# Patient Record
Sex: FEMALE | Race: BLACK OR AFRICAN AMERICAN | Employment: FULL TIME | URBAN - METROPOLITAN AREA
[De-identification: names, ages, dates, MRNs, and addresses within clinical notes are randomized per-mention and may not be internally consistent; named-entity substitution may affect disease eponyms.]

---

## 2019-05-25 ENCOUNTER — HOSPITAL ENCOUNTER (EMERGENCY)
Age: 49
Discharge: HOME | End: 2019-05-25
Attending: EMERGENCY MEDICINE
Payer: MEDICAID

## 2019-05-25 ENCOUNTER — APPOINTMENT (OUTPATIENT)
Dept: GENERAL RADIOLOGY | Age: 49
End: 2019-05-25
Attending: EMERGENCY MEDICINE
Payer: MEDICAID

## 2019-05-25 VITALS
HEART RATE: 65 BPM | TEMPERATURE: 98 F | BODY MASS INDEX: 33.13 KG/M2 | RESPIRATION RATE: 16 BRPM | DIASTOLIC BLOOD PRESSURE: 78 MMHG | OXYGEN SATURATION: 99 % | SYSTOLIC BLOOD PRESSURE: 143 MMHG | WEIGHT: 180 LBS | HEIGHT: 62 IN

## 2019-05-25 DIAGNOSIS — R07.9 CHEST PAIN, UNSPECIFIED TYPE: Primary | ICD-10-CM

## 2019-05-25 LAB
ALBUMIN SERPL-MCNC: 3.4 G/DL (ref 3.4–5)
ALBUMIN/GLOB SERPL: 0.9 {RATIO} (ref 0.8–1.7)
ALP SERPL-CCNC: 68 U/L (ref 45–117)
ALT SERPL-CCNC: 20 U/L (ref 13–56)
ANION GAP SERPL CALC-SCNC: 7 MMOL/L (ref 3–18)
AST SERPL-CCNC: 12 U/L (ref 15–37)
BASOPHILS # BLD: 0.1 K/UL (ref 0–0.1)
BASOPHILS NFR BLD: 1 % (ref 0–2)
BILIRUB SERPL-MCNC: 0.3 MG/DL (ref 0.2–1)
BNP SERPL-MCNC: 352 PG/ML (ref 0–450)
BUN SERPL-MCNC: 17 MG/DL (ref 7–18)
BUN/CREAT SERPL: 25 (ref 12–20)
CALCIUM SERPL-MCNC: 8.7 MG/DL (ref 8.5–10.1)
CHLORIDE SERPL-SCNC: 108 MMOL/L (ref 100–108)
CK MB CFR SERPL CALC: NORMAL % (ref 0–4)
CK MB SERPL-MCNC: <1 NG/ML (ref 5–25)
CK SERPL-CCNC: 54 U/L (ref 26–192)
CO2 SERPL-SCNC: 25 MMOL/L (ref 21–32)
CREAT SERPL-MCNC: 0.69 MG/DL (ref 0.6–1.3)
DIFFERENTIAL METHOD BLD: ABNORMAL
EOSINOPHIL # BLD: 0.2 K/UL (ref 0–0.4)
EOSINOPHIL NFR BLD: 2 % (ref 0–5)
ERYTHROCYTE [DISTWIDTH] IN BLOOD BY AUTOMATED COUNT: 12.6 % (ref 11.6–14.5)
GLOBULIN SER CALC-MCNC: 3.6 G/DL (ref 2–4)
GLUCOSE SERPL-MCNC: 105 MG/DL (ref 74–99)
HCT VFR BLD AUTO: 40.1 % (ref 35–45)
HGB BLD-MCNC: 13.8 G/DL (ref 12–16)
LYMPHOCYTES # BLD: 4 K/UL (ref 0.9–3.6)
LYMPHOCYTES NFR BLD: 47 % (ref 21–52)
MCH RBC QN AUTO: 30.7 PG (ref 24–34)
MCHC RBC AUTO-ENTMCNC: 34.4 G/DL (ref 31–37)
MCV RBC AUTO: 89.3 FL (ref 74–97)
MONOCYTES # BLD: 0.5 K/UL (ref 0.05–1.2)
MONOCYTES NFR BLD: 6 % (ref 3–10)
NEUTS SEG # BLD: 3.6 K/UL (ref 1.8–8)
NEUTS SEG NFR BLD: 44 % (ref 40–73)
PLATELET # BLD AUTO: 298 K/UL (ref 135–420)
PMV BLD AUTO: 10.2 FL (ref 9.2–11.8)
POTASSIUM SERPL-SCNC: 3.7 MMOL/L (ref 3.5–5.5)
PROT SERPL-MCNC: 7 G/DL (ref 6.4–8.2)
RBC # BLD AUTO: 4.49 M/UL (ref 4.2–5.3)
SODIUM SERPL-SCNC: 140 MMOL/L (ref 136–145)
TROPONIN I SERPL-MCNC: <0.02 NG/ML (ref 0–0.04)
WBC # BLD AUTO: 8.3 K/UL (ref 4.6–13.2)

## 2019-05-25 PROCEDURE — 93005 ELECTROCARDIOGRAM TRACING: CPT

## 2019-05-25 PROCEDURE — 85025 COMPLETE CBC W/AUTO DIFF WBC: CPT

## 2019-05-25 PROCEDURE — 80053 COMPREHEN METABOLIC PANEL: CPT

## 2019-05-25 PROCEDURE — 99283 EMERGENCY DEPT VISIT LOW MDM: CPT

## 2019-05-25 PROCEDURE — 82550 ASSAY OF CK (CPK): CPT

## 2019-05-25 PROCEDURE — 83880 ASSAY OF NATRIURETIC PEPTIDE: CPT

## 2019-05-25 PROCEDURE — 71045 X-RAY EXAM CHEST 1 VIEW: CPT

## 2019-05-25 RX ORDER — SIMVASTATIN 10 MG/1
10 TABLET, FILM COATED ORAL
COMMUNITY

## 2019-05-25 RX ORDER — NAPROXEN 500 MG/1
500 TABLET ORAL 2 TIMES DAILY WITH MEALS
Qty: 20 TAB | Refills: 0 | Status: SHIPPED | OUTPATIENT
Start: 2019-05-25 | End: 2019-09-10

## 2019-05-25 NOTE — ED PROVIDER NOTES
EMERGENCY DEPARTMENT HISTORY AND PHYSICAL EXAM    11:08 AM      Date: 5/25/2019  Patient Name: Sabra Da Silva    History of Presenting Illness     Chief Complaint   Patient presents with    Chest Pain         History Provided By: Patient    Additional History (Context): Sabra Da Silva is a 51 y.o. female with hypertension who presents with chest pain for 2 days. Patient states the pain is substernal, 4 out of 10, nonradiating, nothing makes better or worse. Patient has had some mild nausea. However denies any vomiting or diarrhea. She denies any cough or shortness of breath. Patient denies smoking alcohol or recreational drug use. PCP: None    Current Outpatient Medications   Medication Sig Dispense Refill    simvastatin (ZOCOR) 10 mg tablet Take 10 mg by mouth nightly.  naproxen (NAPROSYN) 500 mg tablet Take 1 Tab by mouth two (2) times daily (with meals). 20 Tab 0       Past History     Past Medical History:  Past Medical History:   Diagnosis Date    Heart failure ([address]     Hypertension        Past Surgical History:  Past Surgical History:   Procedure Laterality Date    CARDIAC SURG PROCEDURE UNLIST         Family History:  History reviewed. No pertinent family history. Social History:  Social History     Tobacco Use    Smoking status: Never Smoker    Smokeless tobacco: Never Used   Substance Use Topics    Alcohol use: Not on file    Drug use: Not on file       Allergies: Allergies   Allergen Reactions    Penicillins Rash    Sulfa (Sulfonamide Antibiotics) Rash         Review of Systems       Review of Systems   Constitutional: Negative. Negative for chills, diaphoresis and fever. HENT: Negative. Negative for congestion, rhinorrhea and sore throat. Eyes: Negative. Negative for pain, discharge and redness. Respiratory: Negative. Negative for cough, chest tightness, shortness of breath and wheezing. Cardiovascular: Negative. Negative for chest pain.    Gastrointestinal: Negative. Negative for abdominal pain, constipation, diarrhea, nausea and vomiting. Genitourinary: Negative. Negative for dysuria, flank pain, frequency, hematuria and urgency. Musculoskeletal: Negative. Negative for back pain and neck pain. Skin: Negative. Negative for rash. Neurological: Negative. Negative for syncope, weakness, numbness and headaches. Psychiatric/Behavioral: Negative. All other systems reviewed and are negative. Physical Exam     Visit Vitals  /78 (BP Patient Position: At rest)   Pulse 65   Temp 98 °F (36.7 °C)   Resp 16   Ht 5' 2\" (1.575 m)   Wt 81.6 kg (180 lb)   SpO2 99%   BMI 32.92 kg/m²         Physical Exam   Constitutional: She appears well-developed and well-nourished. Non-toxic appearance. She does not have a sickly appearance. She does not appear ill. No distress. HENT:   Head: Normocephalic and atraumatic. Mouth/Throat: Oropharynx is clear and moist. No oropharyngeal exudate. Eyes: Pupils are equal, round, and reactive to light. Conjunctivae and EOM are normal. No scleral icterus. Neck: Normal range of motion. Neck supple. No hepatojugular reflux and no JVD present. No tracheal deviation present. No thyromegaly present. Cardiovascular: Normal rate, regular rhythm, S1 normal, S2 normal, normal heart sounds, intact distal pulses and normal pulses. Exam reveals no gallop, no S3 and no S4. No murmur heard. Pulses:       Radial pulses are 2+ on the right side, and 2+ on the left side. Dorsalis pedis pulses are 2+ on the right side, and 2+ on the left side. Pulmonary/Chest: Effort normal and breath sounds normal. No respiratory distress. She has no decreased breath sounds. She has no wheezes. She has no rhonchi. She has no rales. Abdominal: Soft. Normal appearance and bowel sounds are normal. She exhibits no distension and no mass. There is no hepatosplenomegaly. There is no tenderness.  There is no rigidity, no rebound, no guarding, no CVA tenderness, no tenderness at McBurney's point and negative Castaneda's sign. Musculoskeletal: Normal range of motion. Strength 5 out of 5 throughout. Lymphadenopathy:        Head (right side): No submental, no submandibular, no preauricular and no occipital adenopathy present. Head (left side): No submental, no submandibular, no preauricular and no occipital adenopathy present. She has no cervical adenopathy. Right: No supraclavicular adenopathy present. Left: No supraclavicular adenopathy present. Neurological: She is alert. She has normal strength and normal reflexes. She is not disoriented. No cranial nerve deficit or sensory deficit. Coordination and gait normal. GCS eye subscore is 4. GCS verbal subscore is 5. GCS motor subscore is 6. Grossly intact. Skin: Skin is warm, dry and intact. No rash noted. She is not diaphoretic. Psychiatric: She has a normal mood and affect. Her speech is normal and behavior is normal. Judgment and thought content normal. Cognition and memory are normal.   Nursing note and vitals reviewed.         Diagnostic Study Results     Labs -  Recent Results (from the past 12 hour(s))   EKG, 12 LEAD, INITIAL    Collection Time: 05/25/19 10:02 AM   Result Value Ref Range    Ventricular Rate 63 BPM    Atrial Rate 63 BPM    P-R Interval 202 ms    QRS Duration 80 ms    Q-T Interval 424 ms    QTC Calculation (Bezet) 433 ms    Calculated P Axis 58 degrees    Calculated R Axis -9 degrees    Calculated T Axis 48 degrees    Diagnosis       Poor data quality, interpretation may be adversely affected  Normal sinus rhythm  Possible Left atrial enlargement  Anteroseptal infarct , age undetermined  Abnormal ECG  No previous ECGs available     CBC WITH AUTOMATED DIFF    Collection Time: 05/25/19 10:15 AM   Result Value Ref Range    WBC 8.3 4.6 - 13.2 K/uL    RBC 4.49 4.20 - 5.30 M/uL    HGB 13.8 12.0 - 16.0 g/dL    HCT 40.1 35.0 - 45.0 %    MCV 89.3 74.0 - 97.0 FL MCH 30.7 24.0 - 34.0 PG    MCHC 34.4 31.0 - 37.0 g/dL    RDW 12.6 11.6 - 14.5 %    PLATELET 829 142 - 999 K/uL    MPV 10.2 9.2 - 11.8 FL    NEUTROPHILS 44 40 - 73 %    LYMPHOCYTES 47 21 - 52 %    MONOCYTES 6 3 - 10 %    EOSINOPHILS 2 0 - 5 %    BASOPHILS 1 0 - 2 %    ABS. NEUTROPHILS 3.6 1.8 - 8.0 K/UL    ABS. LYMPHOCYTES 4.0 (H) 0.9 - 3.6 K/UL    ABS. MONOCYTES 0.5 0.05 - 1.2 K/UL    ABS. EOSINOPHILS 0.2 0.0 - 0.4 K/UL    ABS. BASOPHILS 0.1 0.0 - 0.1 K/UL    DF AUTOMATED     METABOLIC PANEL, COMPREHENSIVE    Collection Time: 05/25/19 10:15 AM   Result Value Ref Range    Sodium 140 136 - 145 mmol/L    Potassium 3.7 3.5 - 5.5 mmol/L    Chloride 108 100 - 108 mmol/L    CO2 25 21 - 32 mmol/L    Anion gap 7 3.0 - 18 mmol/L    Glucose 105 (H) 74 - 99 mg/dL    BUN 17 7.0 - 18 MG/DL    Creatinine 0.69 0.6 - 1.3 MG/DL    BUN/Creatinine ratio 25 (H) 12 - 20      GFR est AA >60 >60 ml/min/1.73m2    GFR est non-AA >60 >60 ml/min/1.73m2    Calcium 8.7 8.5 - 10.1 MG/DL    Bilirubin, total 0.3 0.2 - 1.0 MG/DL    ALT (SGPT) 20 13 - 56 U/L    AST (SGOT) 12 (L) 15 - 37 U/L    Alk. phosphatase 68 45 - 117 U/L    Protein, total 7.0 6.4 - 8.2 g/dL    Albumin 3.4 3.4 - 5.0 g/dL    Globulin 3.6 2.0 - 4.0 g/dL    A-G Ratio 0.9 0.8 - 1.7     CARDIAC PANEL,(CK, CKMB & TROPONIN)    Collection Time: 05/25/19 10:15 AM   Result Value Ref Range    CK 54 26 - 192 U/L    CK - MB <1.0 <3.6 ng/ml    CK-MB Index  0.0 - 4.0 %     CALCULATION NOT PERFORMED WHEN RESULT IS BELOW LINEAR LIMIT    Troponin-I, QT <0.02 0.0 - 0.045 NG/ML   NT-PRO BNP    Collection Time: 05/25/19 10:15 AM   Result Value Ref Range    NT pro- 0 - 450 PG/ML       Radiologic Studies -   XR CHEST PORT   Final Result      No evidence of acute pulmonary disease.               Medical Decision Making   Provider Notes (Medical Decision Making):  MDM  Number of Diagnoses or Management Options  Diagnosis management comments: DIFFERENTIAL DIAGNOSES/ MEDICAL DECISION MAKING:  Chest pain etiologies include acute cardiac events to include possible acute myocardial infarction, acute coronary syndrome, pneumonia, chest wall pain (myofascial/ musculoskeletal etiology), chronic obstructive pulmonary disease (copd), acute asthma exacerbation, congestive heart failure, acute bronchitis, pulmonary embolism, upper respiratory infection, referred abdominal pain, other etiologies, versus combination of the above. I am the first provider for this patient. I reviewed the vital signs, available nursing notes, past medical history, past surgical history, family history and social history. Vital Signs-Reviewed the patient's vital signs. Records Reviewed: Nursing Notes (Time of Review: 11:08 AM)    ED Course: Progress Notes, Reevaluation, and Consults:  Labs essentially normal.  . Chest X-Ray showed No acute process. EKG showed NSR with rate of 63 bpm. With no ST elevations or depression and non specific T wave changes. I do not feel that this is cardiac in nature at this time. Patient is feeling stable. Diagnosis       I have reassessed the patient. Patient is feeling well. Patient will not be prescribed medications at this time. Patient was discharged in stable condition. Patient is to return to emergency department if any new or worsening condition. Clinical Impression:   1. Chest pain, unspecified type        Disposition: Discharged home. Follow-up Information     Follow up With Specialties Details [address]  In 2 days  [facility] [id number]  [phone]         Attestation      Provider Attestation:     I personally performed the services described in the documentation, reviewed the documentation and it accurately and completely records my words and actions utilizing the [address] [place] May 25, 2019 at 7:09 PM - Richard, [address].   It is dictated using utilizing voice recognition software. Unfortunately this leads to occasional typographical errors. I apologize in advance if the situation occurs. If questions arise please do not hesitate to contact me or call our department.

## 2019-05-25 NOTE — DISCHARGE INSTRUCTIONS

## 2019-05-26 LAB
ATRIAL RATE: 63 BPM
CALCULATED P AXIS, ECG09: 58 DEGREES
CALCULATED R AXIS, ECG10: -9 DEGREES
CALCULATED T AXIS, ECG11: 48 DEGREES
DIAGNOSIS, 93000: NORMAL
P-R INTERVAL, ECG05: 202 MS
Q-T INTERVAL, ECG07: 424 MS
QRS DURATION, ECG06: 80 MS
QTC CALCULATION (BEZET), ECG08: 433 MS
VENTRICULAR RATE, ECG03: 63 BPM

## 2019-09-06 ENCOUNTER — HOSPITAL ENCOUNTER (EMERGENCY)
Age: 49
Discharge: HOME | End: 2019-09-06
Attending: EMERGENCY MEDICINE
Payer: MEDICAID

## 2019-09-06 VITALS
HEIGHT: 62 IN | BODY MASS INDEX: 33.13 KG/M2 | WEIGHT: 180 LBS | RESPIRATION RATE: 26 BRPM | OXYGEN SATURATION: 96 % | TEMPERATURE: 98.4 F | HEART RATE: 55 BPM

## 2019-09-06 DIAGNOSIS — T25.221A BURN OF SECOND DEGREE OF RIGHT FOOT, INITIAL ENCOUNTER: Primary | ICD-10-CM

## 2019-09-06 PROCEDURE — 99283 EMERGENCY DEPT VISIT LOW MDM: CPT

## 2019-09-06 PROCEDURE — 96374 THER/PROPH/DIAG INJ IV PUSH: CPT

## 2019-09-06 PROCEDURE — 74011250636 HC RX REV CODE- 250/636: Performed by: EMERGENCY MEDICINE

## 2019-09-06 PROCEDURE — 96376 TX/PRO/DX INJ SAME DRUG ADON: CPT

## 2019-09-06 RX ORDER — MORPHINE SULFATE 4 MG/ML
4 INJECTION, SOLUTION INTRAMUSCULAR; INTRAVENOUS
Status: COMPLETED | OUTPATIENT
Start: 2019-09-06 | End: 2019-09-06

## 2019-09-06 RX ORDER — MORPHINE SULFATE 8 MG/ML
4 INJECTION INTRAVENOUS
Status: COMPLETED | OUTPATIENT
Start: 2019-09-06 | End: 2019-09-06

## 2019-09-06 RX ORDER — OXYCODONE AND ACETAMINOPHEN 5; 325 MG/1; MG/1
1 TABLET ORAL
Qty: 20 TAB | Refills: 0 | Status: SHIPPED | OUTPATIENT
Start: 2019-09-06 | End: 2019-09-13

## 2019-09-06 RX ADMIN — MORPHINE SULFATE 4 MG: 8 INJECTION INTRAVENOUS at 11:32

## 2019-09-06 RX ADMIN — MORPHINE SULFATE 4 MG: 4 INJECTION, SOLUTION INTRAMUSCULAR; INTRAVENOUS at 12:03
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
## 2019-09-10 ENCOUNTER — HOSPITAL ENCOUNTER (EMERGENCY)
Age: 49
Discharge: HOME | End: 2019-09-10
Attending: EMERGENCY MEDICINE
Payer: MEDICAID

## 2019-09-10 VITALS
WEIGHT: 178 LBS | OXYGEN SATURATION: 100 % | SYSTOLIC BLOOD PRESSURE: 163 MMHG | DIASTOLIC BLOOD PRESSURE: 93 MMHG | RESPIRATION RATE: 18 BRPM | HEIGHT: 62 IN | TEMPERATURE: 97.2 F | BODY MASS INDEX: 32.76 KG/M2 | HEART RATE: 65 BPM

## 2019-09-10 DIAGNOSIS — M79.671 RIGHT FOOT PAIN: Primary | ICD-10-CM

## 2019-09-10 DIAGNOSIS — T30.0 BURN: ICD-10-CM

## 2019-09-10 DIAGNOSIS — R03.0 ELEVATED BLOOD PRESSURE READING: ICD-10-CM

## 2019-09-10 PROCEDURE — 99283 EMERGENCY DEPT VISIT LOW MDM: CPT

## 2019-09-10 RX ORDER — BACITRACIN ZINC 500 UNIT/G
OINTMENT (GRAM) TOPICAL
Status: DISCONTINUED | OUTPATIENT
Start: 2019-09-10 | End: 2019-09-10 | Stop reason: HOSPADM

## 2019-09-10 NOTE — DISCHARGE INSTRUCTIONS
Patient Education     Follow-up with your primary care physician within 2 days for reassessment. Bring the results from this visit with you for their review. Return to the ED immediately for any new, worsening, or persistent symptoms, including fever, drainage, or any other medical concerns. Elevated Blood Pressure: Care Instructions  Your Care Instructions    Blood pressure is a measure of how hard the blood pushes against the walls of your arteries. It's normal for blood pressure to go up and down throughout the day. But if it stays up over time, you have high blood pressure. Two numbers tell you your blood pressure. The first number is the systolic pressure. It shows how hard the blood pushes when your heart is pumping. The second number is the diastolic pressure. It shows how hard the blood pushes between heartbeats, when your heart is relaxed and filling with blood. An ideal blood pressure in adults is less than 120/80 (say \"120 over 80\"). High blood pressure is 140/90 or higher. You have high blood pressure if your top number is 140 or higher or your bottom number is 90 or higher, or both. The main test for high blood pressure is simple, fast, and painless. To diagnose high blood pressure, your doctor will test your blood pressure at different times. After testing your blood pressure, your doctor may ask you to test it again when you are home. If you are diagnosed with high blood pressure, you can work with your doctor to make a long-term plan to manage it. Follow-up care is a key part of your treatment and safety. Be sure to make and go to all appointments, and call your doctor if you are having problems. It's also a good idea to know your test results and keep a list of the medicines you take. How can you care for yourself at home? · Do not smoke. Smoking increases your risk for heart attack and stroke. If you need help quitting, talk to your doctor about stop-smoking programs and medicines.  These can increase your chances of quitting for good. · Stay at a healthy weight. · Try to limit how much sodium you eat to less than 2,300 milligrams (mg) a day. Your doctor may ask you to try to eat less than 1,500 mg a day. · Be physically active. Get at least 30 minutes of exercise on most days of the week. Walking is a good choice. You also may want to do other activities, such as running, swimming, cycling, or playing tennis or team sports. · Avoid or limit alcohol. Talk to your doctor about whether you can drink any alcohol. · Eat plenty of fruits, vegetables, and low-fat dairy products. Eat less saturated and total fats. · Learn how to check your blood pressure at home. When should you call for help? Call your doctor now or seek immediate medical care if:  ? · Your blood pressure is much higher than normal (such as 180/110 or higher). ? · You think high blood pressure is causing symptoms such as:  ¨ Severe headache. ¨ Blurry vision. ? Watch closely for changes in your health, and be sure to contact your doctor if:  ? · You do not get better as expected. Where can you learn more? Go to http://anaiMove Networkslisa.info/. Enter E629 in the search box to learn more about \"Elevated Blood Pressure: Care Instructions. \"  Current as of: September 21, 2016  Content Version: 11.4  © 2235-1871 [employer]. Care instructions adapted under license by [employer] (which disclaims liability or warranty for this information). If you have questions about a medical condition or this instruction, always ask your healthcare professional. [place] [address] any warranty or liability for your use of this information. Patient Education        Mo: Care Instructions  Your Care Instructions    Mo--even minor ones--can be very painful. A minor burn may heal within several days, while a more serious burn may take weeks or even months to heal completely.   You may notice that the burned area feels tight and hard while it is healing. It is important to continue to move the area as the burn heals to prevent loss of motion or loss of function in the area. When your skin is damaged by a burn, you have a greater risk of infection. Keep the wound clean and change the bandages regularly to prevent infection and help the burn heal.  Burns can leave permanent scars. Taking good care of the burn as it heals may help prevent bad scars. The doctor has checked you carefully, but problems can develop later. If you notice any problems or new symptoms, get medical treatment right away. Follow-up care is a key part of your treatment and safety. Be sure to make and go to all appointments, and call your doctor if you are having problems. It's also a good idea to know your test results and keep a list of the medicines you take. How can you care for yourself at home? · If your doctor told you how to care for your burn, follow your doctor's instructions. If you did not get instructions, follow this general advice:  ? Wash the burn with clean water 2 times a day. Don't use hydrogen peroxide or alcohol, which can slow healing. ? Gently pat the burn dry after you wash it.  ? You may cover the burn with a thin layer of petroleum jelly, such as Vaseline, and a nonstick bandage. ? Apply more petroleum jelly and replace the bandage as needed. · Protect your burn while it is healing. Cover your burn if you are going out in the cold or the sun. ? Wear long sleeves if the burn is on your hands or arms. ? Wear a hat if the burn is on your face. ? Wear socks and shoes if the burn is on your feet. · Do not break blisters open. This increases the chance of infection. If a blister breaks open by itself, blot up the liquid, and leave the skin that covered the blister. This helps protect the new skin. · If your doctor prescribed antibiotics, take them as directed.  Do not stop taking them just because you feel better. You need to take the full course of antibiotics. For pain and itching  · Take pain medicines exactly as directed. ? If the doctor gave you a prescription medicine for pain, take it as prescribed. ? If you are not taking a prescription pain medicine, ask your doctor if you can take an over-the-counter medicine. · If the burn itches, try not to scratch it. Try an over-the-counter antihistamine such as diphenhydramine (Benadryl) or loratadine (Claritin). Read and follow all instructions on the label. When should you call for help? Call your doctor now or seek immediate medical care if:    · Your pain gets worse.     · You have symptoms of infection, such as:  ? Increased pain, swelling, warmth, or redness near the burn. ? Red streaks leading from the burn. ? Pus draining from the burn. ? A fever.    Watch closely for changes in your health, and be sure to contact your doctor if:    · You do not get better as expected. Where can you learn more? Go to http://anai-lisa.info/. Enter X479 in the search box to learn more about \"Burns: Care Instructions. \"  Current as of: September 23, 2018  Content Version: 12.1  © 3660-1948 [employer]. Care instructions adapted under license by [employer] (which disclaims liability or warranty for this information). If you have questions about a medical condition or this instruction, always ask your healthcare professional. [place] [address] any warranty or liability for your use of this information.

## 2019-09-10 NOTE — ED TRIAGE NOTES
\"I burned the top of my foot on Friday. I tried to get a follow-up appointment, but no one was available. I need to get my foot checked to make sure it's not infected. \"

## 2019-09-10 NOTE — LETTER
[facility] EMERGENCY DEPT 
[address] [id number] [phone] Work/School Note Date: 9/10/2019 To Whom It May concern: 
 
Giovany Dawson was seen and treated today in the emergency room by the following provider(s): 
Attending Provider: Gama Flores MD 
Physician Assistant: DON Mclain. Giovany Dawson may return to work on 9/13/19.  
 
Sincerely, 
 
 
 
 
DON Heath

## 2019-09-10 NOTE — ED PROVIDER NOTES
EMERGENCY DEPARTMENT HISTORY AND PHYSICAL EXAM    7:49 AM      Date: 9/10/2019  Patient Name: Vania Rosas    History of Presenting Illness     Chief Complaint   Patient presents with    Foot Pain         History Provided By: Patient    Additional History (Context): Vania Rosas is a 51 y.o. female with hx of HTN, CHF who presents with c/o right foot pain for 5 days. Patient notes she was evaluated for a burn of the foot on 9/6 in the ED. Notes she has been applying Neosporin and dressing daily. Denies fever or chills, drainage from site. PCP: None    Current Facility-Administered Medications   Medication Dose Route Frequency Provider Last Rate Last Dose    bacitracin zinc (BACITRACIN) 500 unit/gram ointment   Topical NOW Niurka Hernandez PA         Current Outpatient Medications   Medication Sig Dispense Refill    oxyCODONE-acetaminophen (PERCOCET) 5-325 mg per tablet Take 1 Tab by mouth every four (4) hours as needed for Pain for up to 7 days. Max Daily Amount: 12 Tabs. 20 Tab 0    simvastatin (ZOCOR) 10 mg tablet Take 10 mg by mouth nightly. Past History     Past Medical History:  Past Medical History:   Diagnosis Date    Heart failure ([address]     Hypertension        Past Surgical History:  Past Surgical History:   Procedure Laterality Date    CARDIAC SURG PROCEDURE UNLIST         Family History:  History reviewed. No pertinent family history. Social History:  Social History     Tobacco Use    Smoking status: Current Every Day Smoker    Smokeless tobacco: Never Used   Substance Use Topics    Alcohol use: Yes     Comment: socially    Drug use: Never       Allergies: Allergies   Allergen Reactions    Penicillins Rash    Sulfa (Sulfonamide Antibiotics) Rash         Review of Systems       Review of Systems   Constitutional: Negative for chills and fever. Respiratory: Negative for shortness of breath. Cardiovascular: Negative for chest pain.    Gastrointestinal: Negative for abdominal pain, nausea and vomiting. Skin: Positive for wound. Negative for rash. Neurological: Negative for weakness. All other systems reviewed and are negative. Physical Exam     Visit Vitals  BP (!) 163/93 (BP 1 Location: Left arm, BP Patient Position: At rest;Sitting)   Pulse 65   Temp 97.2 °F (36.2 °C)   Resp 18   Ht 5' 2\" (1.575 m)   Wt 80.7 kg (178 lb)   SpO2 100%   BMI 32.56 kg/m²         Physical Exam   Constitutional: She appears well-developed and well-nourished. No distress. HENT:   Head: Normocephalic and atraumatic. Neck: Normal range of motion. Neck supple. Cardiovascular: Normal rate, regular rhythm, normal heart sounds and intact distal pulses. Exam reveals no gallop and no friction rub. No murmur heard. Pulmonary/Chest: Effort normal and breath sounds normal. No respiratory distress. She has no wheezes. She has no rales. Musculoskeletal: Normal range of motion. Neurological: She is alert. Skin: Skin is warm. No rash noted. She is not diaphoretic. Nursing note and vitals reviewed. Diagnostic Study Results     Labs -  No results found for this or any previous visit (from the past 12 hour(s)). Radiologic Studies -   No orders to display         Medical Decision Making   I am the first provider for this patient. I reviewed the vital signs, available nursing notes, past medical history, past surgical history, family history and social history. Vital Signs-Reviewed the patient's vital signs. Records Reviewed: Nursing Notes and Old Medical Records (Time of Review: 7:49 AM)    ED Course: Progress Notes, Reevaluation, and Consults:  8:49 AM  Reviewed plan with patient. Discussed need for close outpatient follow-up this week for reassessment.  Discussed strict return precautions, including fever, drainage, or any other medical concerns    One or more blood pressure readings were noted elevated during the patient's presentation in the emergency department today.  This abnormal reading has been cited in the patients' diagnosis, and they have been encouraged to follow up with their primary care physician, or referred to a consultant for further evaluation and treatment. Provider Notes (Medical Decision Making): 30-year-old female who presents due to right foot pain. Patient evaluated in the ED on 9/6 for a burn. Sensation intact. No evidence of cellulitis or abscess. Dressing applied, post-op shoe provided. Stable for discharge with symptomatic management and close outpatient follow-up      Diagnosis     Clinical Impression:   1. Right foot pain    2. Burn    3. Elevated blood pressure reading        Disposition: home     Follow-up Information     Follow up With Specialties Details Why [address] [facility]    [facility] EMERGENCY DEPT Emergency Medicine  If symptoms worsen [address] [place] [address] [address] [address]  [address]  Schedule an appointment as soon as possible for a visit  Konrad Billingsley [address]  [address]  [phone]           Patient's Medications   Start Taking    No medications on file   Continue Taking    OXYCODONE-ACETAMINOPHEN (PERCOCET) 5-325 MG PER TABLET    Take 1 Tab by mouth every four (4) hours as needed for Pain for up to 7 days. Max Daily Amount: 12 Tabs. SIMVASTATIN (ZOCOR) 10 MG TABLET    Take 10 mg by mouth nightly. These Medications have changed    No medications on file   Stop Taking    NAPROXEN (NAPROSYN) 500 MG TABLET    Take 1 Tab by mouth two (2) times daily (with meals). Dictation disclaimer:  Please note that this dictation was completed with [email or web address], the computer voice recognition software. Quite often unanticipated grammatical, syntax, homophones, and other interpretive errors are inadvertently transcribed by the computer software. Please disregard these errors. Please excuse any errors that have escaped final proofreading.

## 2019-11-18 ENCOUNTER — HOSPITAL ENCOUNTER (EMERGENCY)
Age: 49
Discharge: HOME | End: 2019-11-18
Attending: EMERGENCY MEDICINE
Payer: MEDICAID

## 2019-11-18 VITALS
SYSTOLIC BLOOD PRESSURE: 164 MMHG | HEART RATE: 88 BPM | RESPIRATION RATE: 18 BRPM | OXYGEN SATURATION: 96 % | DIASTOLIC BLOOD PRESSURE: 101 MMHG

## 2019-11-18 DIAGNOSIS — A59.01 TRICHOMONAL VAGINITIS: ICD-10-CM

## 2019-11-18 DIAGNOSIS — N93.9 VAGINAL BLEEDING: Primary | ICD-10-CM

## 2019-11-18 DIAGNOSIS — N72 CERVICITIS: ICD-10-CM

## 2019-11-18 LAB
ALBUMIN SERPL-MCNC: 4 G/DL (ref 3.4–5)
ALBUMIN/GLOB SERPL: 1 {RATIO} (ref 0.8–1.7)
ALP SERPL-CCNC: 73 U/L (ref 45–117)
ALT SERPL-CCNC: 22 U/L (ref 13–56)
ANION GAP SERPL CALC-SCNC: 5 MMOL/L (ref 3–18)
APPEARANCE UR: CLEAR
AST SERPL-CCNC: 13 U/L (ref 10–38)
BACTERIA URNS QL MICRO: ABNORMAL /HPF
BASOPHILS # BLD: 0 K/UL (ref 0–0.06)
BASOPHILS NFR BLD: 0 % (ref 0–3)
BILIRUB SERPL-MCNC: 0.3 MG/DL (ref 0.2–1)
BILIRUB UR QL: NEGATIVE
BUN SERPL-MCNC: 16 MG/DL (ref 7–18)
BUN/CREAT SERPL: 22 (ref 12–20)
CALCIUM SERPL-MCNC: 9.1 MG/DL (ref 8.5–10.1)
CHLORIDE SERPL-SCNC: 108 MMOL/L (ref 100–111)
CO2 SERPL-SCNC: 28 MMOL/L (ref 21–32)
COLOR UR: YELLOW
CREAT SERPL-MCNC: 0.73 MG/DL (ref 0.6–1.3)
DIFFERENTIAL METHOD BLD: ABNORMAL
EOSINOPHIL # BLD: 0.1 K/UL (ref 0–0.4)
EOSINOPHIL NFR BLD: 1 % (ref 0–5)
EPITH CASTS URNS QL MICRO: ABNORMAL /LPF (ref 0–5)
ERYTHROCYTE [DISTWIDTH] IN BLOOD BY AUTOMATED COUNT: 13.6 % (ref 11.6–14.5)
GLOBULIN SER CALC-MCNC: 4.1 G/DL (ref 2–4)
GLUCOSE SERPL-MCNC: 86 MG/DL (ref 74–99)
GLUCOSE UR STRIP.AUTO-MCNC: NEGATIVE MG/DL
HCG UR QL: NEGATIVE
HCT VFR BLD AUTO: 40.9 % (ref 35–45)
HGB BLD-MCNC: 13.6 G/DL (ref 12–16)
HGB UR QL STRIP: NEGATIVE
KETONES UR QL STRIP.AUTO: NEGATIVE MG/DL
LEUKOCYTE ESTERASE UR QL STRIP.AUTO: ABNORMAL
LYMPHOCYTES # BLD: 5.5 K/UL (ref 0.8–3.5)
LYMPHOCYTES NFR BLD: 57 % (ref 20–51)
MCH RBC QN AUTO: 30 PG (ref 24–34)
MCHC RBC AUTO-ENTMCNC: 33.3 G/DL (ref 31–37)
MCV RBC AUTO: 90.3 FL (ref 74–97)
MONOCYTES # BLD: 0.7 K/UL (ref 0–1)
MONOCYTES NFR BLD: 7 % (ref 2–9)
MUCOUS THREADS URNS QL MICRO: ABNORMAL /LPF
NEUTS SEG # BLD: 3.4 K/UL (ref 1.8–8)
NEUTS SEG NFR BLD: 35 % (ref 42–75)
NITRITE UR QL STRIP.AUTO: NEGATIVE
PH UR STRIP: 5.5 [PH] (ref 5–8)
PLATELET # BLD AUTO: 327 K/UL (ref 135–420)
PLATELET COMMENTS,PCOM: ABNORMAL
PMV BLD AUTO: 10.2 FL (ref 9.2–11.8)
POTASSIUM SERPL-SCNC: 4 MMOL/L (ref 3.5–5.5)
PROT SERPL-MCNC: 8.1 G/DL (ref 6.4–8.2)
PROT UR STRIP-MCNC: NEGATIVE MG/DL
RBC # BLD AUTO: 4.53 M/UL (ref 4.2–5.3)
RBC #/AREA URNS HPF: ABNORMAL /HPF (ref 0–5)
RBC MORPH BLD: ABNORMAL
SERVICE CMNT-IMP: NORMAL
SODIUM SERPL-SCNC: 141 MMOL/L (ref 136–145)
SP GR UR REFRACTOMETRY: 1.03 (ref 1–1.03)
UROBILINOGEN UR QL STRIP.AUTO: 1 EU/DL (ref 0.2–1)
WBC # BLD AUTO: 9.7 K/UL (ref 4.6–13.2)
WBC URNS QL MICRO: ABNORMAL /HPF (ref 0–5)
WET PREP GENITAL: NORMAL

## 2019-11-18 PROCEDURE — 74011000250 HC RX REV CODE- 250: Performed by: EMERGENCY MEDICINE

## 2019-11-18 PROCEDURE — 85025 COMPLETE CBC W/AUTO DIFF WBC: CPT

## 2019-11-18 PROCEDURE — 80053 COMPREHEN METABOLIC PANEL: CPT

## 2019-11-18 PROCEDURE — 99284 EMERGENCY DEPT VISIT MOD MDM: CPT

## 2019-11-18 PROCEDURE — 81025 URINE PREGNANCY TEST: CPT

## 2019-11-18 PROCEDURE — 81001 URINALYSIS AUTO W/SCOPE: CPT

## 2019-11-18 PROCEDURE — 87491 CHLMYD TRACH DNA AMP PROBE: CPT

## 2019-11-18 PROCEDURE — 74011250637 HC RX REV CODE- 250/637: Performed by: EMERGENCY MEDICINE

## 2019-11-18 PROCEDURE — 74011250636 HC RX REV CODE- 250/636: Performed by: EMERGENCY MEDICINE

## 2019-11-18 PROCEDURE — 87210 SMEAR WET MOUNT SALINE/INK: CPT

## 2019-11-18 PROCEDURE — 96372 THER/PROPH/DIAG INJ SC/IM: CPT

## 2019-11-18 RX ORDER — METRONIDAZOLE 500 MG/1
500 TABLET ORAL 2 TIMES DAILY
Qty: 14 TAB | Refills: 0 | Status: SHIPPED | OUTPATIENT
Start: 2019-11-18 | End: 2019-11-25

## 2019-11-18 RX ORDER — AZITHROMYCIN 250 MG/1
1000 TABLET, FILM COATED ORAL
Status: COMPLETED | OUTPATIENT
Start: 2019-11-18 | End: 2019-11-18

## 2019-11-18 RX ORDER — CEFTRIAXONE 250 MG/8ML
250 INJECTION, POWDER, FOR SOLUTION INTRAMUSCULAR; INTRAVENOUS ONCE
Status: DISCONTINUED | OUTPATIENT
Start: 2019-11-18 | End: 2019-11-18

## 2019-11-18 RX ADMIN — AZITHROMYCIN 1000 MG: 250 TABLET, FILM COATED ORAL at 17:14

## 2019-11-18 RX ADMIN — LIDOCAINE HYDROCHLORIDE 250 MG: 10 INJECTION, SOLUTION EPIDURAL; INFILTRATION; INTRACAUDAL; PERINEURAL at 17:14

## 2019-11-18 NOTE — DISCHARGE INSTRUCTIONS
Patient Education        Abnormal Uterine Bleeding: Care Instructions  Your Care Instructions    Abnormal uterine bleeding (AUB) is irregular bleeding from the uterus that is longer or heavier than usual or does not occur at your regular time. Sometimes it is caused by changes in hormone levels. It can also be caused by growths in the uterus, such as fibroids or polyps. Sometimes a cause cannot be found. You may have heavy bleeding when you are not expecting your period. Your doctor may suggest a pregnancy test, if you think you are pregnant. Follow-up care is a key part of your treatment and safety. Be sure to make and go to all appointments, and call your doctor if you are having problems. It's also a good idea to know your test results and keep a list of the medicines you take. How can you care for yourself at home? · Be safe with medicines. Take pain medicines exactly as directed. ? If the doctor gave you a prescription medicine for pain, take it as prescribed. ? If you are not taking a prescription pain medicine, ask your doctor if you can take an over-the-counter medicine. · You may be low in iron because of blood loss. Eat a balanced diet that is high in iron and vitamin C. Foods rich in iron include red meat, shellfish, eggs, beans, and leafy green vegetables. Talk to your doctor about whether you need to take iron pills or a multivitamin. When should you call for help? Call 911 anytime you think you may need emergency care. For example, call if:    · You passed out (lost consciousness).    Call your doctor now or seek immediate medical care if:    · You have new or worse belly or pelvic pain.     · You have severe vaginal bleeding.     · You feel dizzy or lightheaded, or you feel like you may faint.    Watch closely for changes in your health, and be sure to contact your doctor if:    · You think you may be pregnant.     · Your bleeding gets worse.     · You do not get better as expected.    Where can you learn more? Go to http://anai-lisa.info/. Enter C788 in the search box to learn more about \"Abnormal Uterine Bleeding: Care Instructions. \"  Current as of: February 19, 2019  Content Version: 12.2  © 0690-9708 [employer]. Care instructions adapted under license by [employer] (which disclaims liability or warranty for this information). If you have questions about a medical condition or this instruction, always ask your healthcare professional. [place] [address] any warranty or liability for your use of this information. Patient Education        Cervicitis: Care Instructions  Your Care Instructions    Cervicitis means that your cervix is inflamed. The cervix is the part of your uterus that opens into your vagina. This problem is most often caused by an infection. Some women get it after they have a sexually transmitted infection (STI). These include gonorrhea and chlamydia. It can also be caused by irritation from some types of birth control. Two examples are the cervical cap or diaphragm. Your doctor may do some tests to help find the cause of the problem. It is very important to treat cervicitis. If you don't, you could have serious health problems. For this reason, you may need a test after your treatment to make sure the infection is gone. Follow-up care is a key part of your treatment and safety. Be sure to make and go to all appointments, and call your doctor if you are having problems. It's also a good idea to know your test results and keep a list of the medicines you take. How can you care for yourself at home? · Take your antibiotics as directed. Do not stop taking them just because you feel better. You need to take the full course of antibiotics. · If your doctor prescribed antifungal medicine, use it as directed. · While you are being treated, do not have sex.  If your treatment is one dose of antibiotics, wait at least 7 days after you take your medicine before you have any kind of sexual contact. Even if you use a condom, you could get infected again. · It's important to tell your sex partner or partners that you have cervicitis. It may be related to an STI. Any partners should get tested and then treated if they have an STI. This is true even if they don't have symptoms. · Do not douche. It can change the normal balance of substances in your vagina. · Do not use tampons while you are being treated. To prevent STIs  · Use latex condoms every time you have sex. Use them from the start to the end of sexual contact. · Talk to your partner before you have sex. Find out if he or she has or is at risk for any sexually transmitted infection (STI). Keep in mind that a person may be able to spread an STI even if he or she does not have symptoms. · Do not have sex with anyone who has symptoms of an STI. These include sores on the genitals or mouth. · Having one sex partner (who does not have STIs and does not have sex with anyone else) is a good way to avoid STIs. When should you call for help? Call your doctor now or seek immediate medical care if:    · You have new or worse pain in your belly or pelvis.     · You have vaginal discharge that has increased in amount or smells bad.     · You have unusual vaginal bleeding.     · You have a new or higher fever.    Watch closely for changes in your health, and be sure to contact your doctor if:    · You do not get better as expected. Where can you learn more? Go to http://anai-lisa.info/. Enter T975 in the search box to learn more about \"Cervicitis: Care Instructions. \"  Current as of: December 19, 2018  Content Version: 12.2  © 9396-0190 [employer]. Care instructions adapted under license by [employer] (which disclaims liability or warranty for this information).  If you have questions about a medical condition or this instruction, always ask your healthcare professional. [place] [address] any warranty or liability for your use of this information. Patient Education        Trichomoniasis: Care Instructions  Your Care Instructions  Trichomoniasis is a sexually transmitted infection (STI) that is spread by having sex with an infected partner. Trichomoniasis is commonly called trich (say \"trick\"). In women, trich may cause vaginal itching and a smelly discharge. But in many cases, especially in men, there are no symptoms. Pina Shoulders is treated so that you do not spread it to others. Both you and your sex partner or partners should be treated at the same time so you do not infect each other again. Trich may cause problems with pregnancy. Your doctor will talk with you about treatment for Trich if you are pregnant. Follow-up care is a key part of your treatment and safety. Be sure to make and go to all appointments, and call your doctor if you are having problems. It's also a good idea to know your test results and keep a list of the medicines you take. How can you care for yourself at home? · Take your antibiotics as directed. Do not stop taking them just because you feel better. You need to take the full course of antibiotics. · Do not have sex while you are being treated. If your doctor gave you a single dose of antibiotics, do not have sex for one week after being treated and until your partner also has been treated. · Tell your sex partner (or partners) that he or she will also need to be tested and treated. · Use a cold water compress or cool baths to relieve itching. To prevent trichomoniasis in the future  · Use latex condoms every time you have sex. Use them from the beginning to the end of sexual contact. · Talk to your partner before having sex. Find out if he or she has or is at risk for trich or any other STI.  Keep in mind that a person may be able to spread an STI even if he or she does not have symptoms. · Do not have sex if you are being treated for trich or any other STI. · Do not have sex with anyone who has symptoms of an STI, such as sores on the genitals or mouth. · Having one sex partner (who does not have STIs and does not have sex with anyone else) is a good way to avoid STIs. When should you call for help? Call your doctor now or seek immediate medical care if:    · You have unusual vaginal bleeding.     · You have a fever.     · You have new discharge from the vagina or penis.     · You have pelvic pain.    Watch closely for changes in your health, and be sure to contact your doctor if:    · You do not get better as expected.     · You have any new symptoms or your symptoms get worse. Where can you learn more? Go to http://anai-lisa.info/. Enter H684 in the search box to learn more about \"Trichomoniasis: Care Instructions. \"  Current as of: September 11, 2018  Content Version: 12.2  © 1457-4976 [employer]. Care instructions adapted under license by [employer] (which disclaims liability or warranty for this information). If you have questions about a medical condition or this instruction, always ask your healthcare professional. [address] any warranty or liability for your use of this information.

## 2019-11-18 NOTE — ED NOTES
Discharge instructions reviewed with pt by provider.  Iv discontinued pt left ED in stable condition

## 2019-11-18 NOTE — ED PROVIDER NOTES
52 yo F with a history of htn, MI with stents currently not taking any medications presented to ED with 29 days of continuous vaginal bleeding upto 6 tampons per day. Patient reports normal menstrual cycle in the past. Patient is sexually active with one partner. Denies any prior history of STI and possible exposure. Denies any vaginal discharge, fever, chills. Denies previous similar symptoms. No history of abnormal Pap smear and last smear was 1 year ago in [place]. ROS negative otherwise. Past Medical History:   Diagnosis Date    Heart failure ([address]     Hypertension        Past Surgical History:   Procedure Laterality Date    CARDIAC SURG PROCEDURE UNLIST           No family history on file.     Social History     Socioeconomic History    Marital status: SINGLE     Spouse name: Not on file    Number of children: Not on file    Years of education: Not on file    Highest education level: Not on file   Occupational History    Not on file   Social Needs    Financial resource strain: Not on file    Food insecurity:     Worry: Not on file     Inability: Not on file    Transportation needs:     Medical: Not on file     Non-medical: Not on file   Tobacco Use    Smoking status: Current Every Day Smoker    Smokeless tobacco: Never Used   Substance and Sexual Activity    Alcohol use: Yes     Comment: socially    Drug use: Never    Sexual activity: Not on file   Lifestyle    Physical activity:     Days per week: Not on file     Minutes per session: Not on file    Stress: Not on file   Relationships    Social connections:     Talks on phone: Not on file     Gets together: Not on file     Attends Oriental orthodox service: Not on file     Active member of club or organization: Not on file     Attends meetings of clubs or organizations: Not on file     Relationship status: Not on file    Intimate partner violence:     Fear of current or ex partner: Not on file     Emotionally abused: Not on file Physically abused: Not on file     Forced sexual activity: Not on file   Other Topics Concern    Not on file   Social History Narrative    Not on file         ALLERGIES: Penicillins and Sulfa (sulfonamide antibiotics)    Review of Systems   Constitutional: Negative for appetite change, chills, fatigue and fever. Respiratory: Negative for shortness of breath. Cardiovascular: Negative for chest pain, palpitations and leg swelling. Gastrointestinal: Negative for abdominal distention, abdominal pain, anal bleeding, blood in stool, constipation, diarrhea, nausea and vomiting. Genitourinary: Positive for menstrual problem, pelvic pain and vaginal bleeding. Negative for decreased urine volume, difficulty urinating, dyspareunia, dysuria, flank pain, frequency, genital sores, hematuria, urgency, vaginal discharge and vaginal pain. Skin: Negative for pallor and rash. Neurological: Negative for dizziness and light-headedness. Vitals:    11/18/19 1434 11/18/19 1435   BP:  (!) 164/101   Pulse: 88    Resp: 18    SpO2: 96%             Physical Exam   Constitutional: She appears well-developed and well-nourished. HENT:   Head: Normocephalic and atraumatic. Eyes: Pupils are equal, round, and reactive to light. Cardiovascular: Normal rate, regular rhythm and normal heart sounds. Pulmonary/Chest: Effort normal and breath sounds normal.   Abdominal: Soft. Bowel sounds are normal. She exhibits no distension and no mass. There is no tenderness. There is no rebound and no guarding. Genitourinary: Uterus normal. No vaginal discharge found. Psychiatric: She has a normal mood and affect. MDM  Number of Diagnoses or Management Options  Diagnosis management comments: 50 yo F with abnormal vaginal bleed for 4 weeks. No prior history. Patient is hypertensive to 164/101. She is currently no in any pain. Normal abdominal physical exam including normal pelvic exam with no discharge, bleeding or CMT.  Will follow up U preg, CBC, CMP, G& C swab.           Procedures

## 2019-11-20 LAB
C TRACH RRNA SPEC QL NAA+PROBE: NEGATIVE
N GONORRHOEA RRNA SPEC QL NAA+PROBE: NEGATIVE
SPECIMEN SOURCE: NORMAL

## 2020-02-13 ENCOUNTER — APPOINTMENT (OUTPATIENT)
Dept: GENERAL RADIOLOGY | Age: 50
End: 2020-02-13
Attending: NURSE PRACTITIONER
Payer: COMMERCIAL

## 2020-02-13 ENCOUNTER — HOSPITAL ENCOUNTER (EMERGENCY)
Age: 50
Discharge: HOME | End: 2020-02-13
Attending: EMERGENCY MEDICINE
Payer: COMMERCIAL

## 2020-02-13 VITALS
WEIGHT: 174.8 LBS | SYSTOLIC BLOOD PRESSURE: 174 MMHG | OXYGEN SATURATION: 97 % | HEART RATE: 81 BPM | RESPIRATION RATE: 18 BRPM | DIASTOLIC BLOOD PRESSURE: 102 MMHG | BODY MASS INDEX: 31.97 KG/M2 | TEMPERATURE: 98.6 F

## 2020-02-13 DIAGNOSIS — S13.4XXA WHIPLASH INJURY TO NECK, INITIAL ENCOUNTER: Primary | ICD-10-CM
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
## 2020-02-13 DIAGNOSIS — M25.561 ACUTE PAIN OF RIGHT KNEE: ICD-10-CM

## 2020-02-13 PROCEDURE — 99283 EMERGENCY DEPT VISIT LOW MDM: CPT

## 2020-02-13 PROCEDURE — 73560 X-RAY EXAM OF KNEE 1 OR 2: CPT

## 2020-02-13 PROCEDURE — 74011250637 HC RX REV CODE- 250/637: Performed by: NURSE PRACTITIONER

## 2020-02-13 RX ORDER — CYCLOBENZAPRINE HCL 10 MG
10 TABLET ORAL
Status: COMPLETED | OUTPATIENT
Start: 2020-02-13 | End: 2020-02-13

## 2020-02-13 RX ORDER — CYCLOBENZAPRINE HCL 10 MG
10 TABLET ORAL
Qty: 21 TAB | Refills: 0 | Status: SHIPPED | OUTPATIENT
Start: 2020-02-13

## 2020-02-13 RX ORDER — IBUPROFEN 800 MG/1
800 TABLET ORAL
Qty: 20 TAB | Refills: 0 | Status: SHIPPED | OUTPATIENT
Start: 2020-02-13 | End: 2020-02-13

## 2020-02-13 RX ORDER — IBUPROFEN 800 MG/1
800 TABLET ORAL
Qty: 20 TAB | Refills: 0 | Status: SHIPPED | OUTPATIENT
Start: 2020-02-13 | End: 2020-02-20

## 2020-02-13 RX ORDER — IBUPROFEN 400 MG/1
800 TABLET ORAL
Status: COMPLETED | OUTPATIENT
Start: 2020-02-13 | End: 2020-02-13

## 2020-02-13 RX ADMIN — CYCLOBENZAPRINE 10 MG: 10 TABLET, FILM COATED ORAL at 22:22

## 2020-02-13 RX ADMIN — IBUPROFEN 800 MG: 400 TABLET ORAL at 22:22

## 2020-02-13 NOTE — LETTER
[address] [place] [address] [address] [facility] EMERGENCY DEPT 
[address] [facility] [address] [phone] Work/School Note Date: 2/13/2020 To Whom It May concern: 
 
Val Saeed was seen and treated today in the emergency room by the following provider(s): 
Nurse Practitioner: Mateo Bravo NP. Val Saeed may return to work on 2/15/20.  
 
Sincerely, 
 
 
 
 
DON Dailey

## 2020-02-14 NOTE — DISCHARGE INSTRUCTIONS
Patient Education        Neck Strain: Care Instructions  Your Care Instructions    You have strained the muscles and ligaments in your neck. A sudden, awkward movement can strain the neck. This often occurs with falls or car accidents or during certain sports. Everyday activities like working on a computer or sleeping can also cause neck strain if they force you to hold your neck in an awkward position for a long time. It is common for neck pain to get worse for a day or two after an injury, but it should start to feel better after that. You may have more pain and stiffness for several days before it gets better. This is expected. It may take a few weeks or longer for it to heal completely. Good home treatment can help you get better faster and avoid future neck problems. Follow-up care is a key part of your treatment and safety. Be sure to make and go to all appointments, and call your doctor if you are having problems. It's also a good idea to know your test results and keep a list of the medicines you take. How can you care for yourself at home? · If you were given a neck brace (cervical collar) to limit neck motion, wear it as instructed for as many days as your doctor tells you to. Do not wear it longer than you were told to. Wearing a brace for too long can make neck stiffness worse and weaken the neck muscles. · You can try using heat or ice to see if it helps. ? Try using a heating pad on a low or medium setting for 15 to 20 minutes every 2 to 3 hours. Try a warm shower in place of one session with the heating pad. You can also buy single-use heat wraps that last up to 8 hours. ? You can also try an ice pack for 10 to 15 minutes every 2 to 3 hours. · Take pain medicines exactly as directed. ? If the doctor gave you a prescription medicine for pain, take it as prescribed. ? If you are not taking a prescription pain medicine, ask your doctor if you can take an over-the-counter medicine.   · Gently rub the area to relieve pain and help with blood flow. Do not massage the area if it hurts to do so. · Do not do anything that makes the pain worse. Take it easy for a couple of days. You can do your usual activities if they do not hurt your neck or put it at risk for more stress or injury. · Try sleeping on a special neck pillow. Place it under your neck, not under your head. Placing a tightly rolled-up towel under your neck while you sleep will also work. If you use a neck pillow or rolled towel, do not use your regular pillow at the same time. · To prevent future neck pain, do exercises to stretch and strengthen your neck and back. Learn how to use good posture, safe lifting techniques, and proper body mechanics. When should you call for help? Call 911 anytime you think you may need emergency care. For example, call if:    · You are unable to move an arm or a leg at all.   [facility] your doctor now or seek immediate medical care if:    · You have new or worse symptoms in your arms, legs, chest, belly, or buttocks. Symptoms may include:  ? Numbness or tingling. ? Weakness. ? Pain.     · You lose bladder or bowel control.    Watch closely for changes in your health, and be sure to contact your doctor if:    · You are not getting better as expected. Where can you learn more? Go to http://anai-lisa.info/. Enter M253 in the search box to learn more about \"Neck Strain: Care Instructions. \"  Current as of: June 26, 2019  Content Version: 12.2  © 5729-3732 Healthwise[employer]. Care instructions adapted under license by [employer] (which disclaims liability or warranty for this information). If you have questions about a medical condition or this instruction, always ask your healthcare professional. [place] [address] any warranty or liability for your use of this information.          Patient Education        Back Strain: Care Instructions  Overview    A back strain happens when you overstretch, or pull, a muscle in your back. You may hurt your back in an accident or when you exercise or lift something. Sometimes you may not know how you hurt your back. Most back pain will get better with rest and time. You can take care of yourself at home to help your back heal.  Follow-up care is a key part of your treatment and safety. Be sure to make and go to all appointments, and call your doctor if you are having problems. It's also a good idea to know your test results and keep a list of the medicines you take. How can you care for yourself at home? · Try to stay as active as you can, but stop or reduce any activity that causes pain. · Put ice or a cold pack on the sore muscle for 10 to 20 minutes at a time to stop swelling. Try this every 1 to 2 hours for 3 days (when you are awake) or until the swelling goes down. Put a thin cloth between the ice pack and your skin. · After 2 or 3 days, apply a heating pad on low or a warm cloth to your back. Some doctors suggest that you go back and forth between hot and cold treatments. · Take pain medicines exactly as directed. ? If the doctor gave you a prescription medicine for pain, take it as prescribed. ? If you are not taking a prescription pain medicine, ask your doctor if you can take an over-the-counter medicine. · Try sleeping on your side with a pillow between your legs. Or put a pillow under your knees when you lie on your back. These measures can ease pain in your lower back. · Return to your usual level of activity slowly. When should you call for help? Call 911 anytime you think you may need emergency care. For example, call if:    · You are unable to move a leg at all.   [facility] your doctor now or seek immediate medical care if:    · You have new or worse symptoms in your legs, belly, or buttocks. Symptoms may include:  ? Numbness or tingling. ? Weakness.   ? Pain.     · You lose bladder or bowel control.    Watch closely for changes in your health, and be sure to contact your doctor if:    · You have a fever, lose weight, or don't feel well.     · You are not getting better as expected. Where can you learn more? Go to http://anai-lisa.info/. Enter A812 in the search box to learn more about \"Back Strain: Care Instructions. \"  Current as of: 2019  Content Version: 12.2  © 0370-8781 [employer]. Care instructions adapted under license by [employer] (which disclaims liability or warranty for this information). If you have questions about a medical condition or this instruction, always ask your healthcare professional. [address] any warranty or liability for your use of this information. [employer] Activation    Thank you for requesting access to [employer]. Please follow the instructions below to securely access and download your online medical record. [employer]tDB allows you to send messages to your doctor, view your test results, renew your prescriptions, schedule appointments, and more. How Do I Sign Up? 1. In your internet browser, go to [email or web address]  2. Click on the First Time User? Click Here link in the Sign In box. You will be redirect to the New Member Sign Up page. 3. Enter your Volt[employer] Access Code exactly as it appears below. You will not need to use this code after youve completed the sign-up process. If you do not sign up before the expiration date, you must request a new code. VoltDB Access Code: [email or web address]  Expires: 3/29/2020  8:36 PM (This is the date your VoltDB access code will )    4. Enter the last four digits of your Social Security Number (xxxx) and Date of Birth (mm/dd/yyyy) as indicated and click Submit. You will be taken to the next sign-up page. 5. Create a VoltDB ID. This will be your [employer] login ID and cannot be changed, so think of one that is secure and easy to remember.   6. Create a VoltDB password. You can change your password at any time. 7. Enter your Password Reset Question and Answer. This can be used at a later time if you forget your password. 8. Enter your e-mail address. You will receive e-mail notification when new information is available in [address] 9. Click Sign Up. You can now view and download portions of your medical record. 10. Click the Download Summary menu link to download a portable copy of your medical information. Additional Information    If you have questions, please visit the Frequently Asked Questions section of the [email or web address] website at [email or web address] Remember, [email or web address] is NOT to be used for urgent needs. For medical emergencies, dial 911.
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
## 2022-08-17 ENCOUNTER — TRANSCRIBE ORDER (OUTPATIENT)
Dept: SCHEDULING | Age: 52
End: 2022-08-17

## 2022-08-17 DIAGNOSIS — Z12.31 SCREENING MAMMOGRAM FOR BREAST CANCER: Primary | ICD-10-CM

## 2022-08-17 DIAGNOSIS — Z80.3 FAMILY HISTORY OF BREAST CANCER: ICD-10-CM

## 2023-01-31 DIAGNOSIS — Z12.31 SCREENING MAMMOGRAM FOR BREAST CANCER: Primary | ICD-10-CM

## 2023-01-31 DIAGNOSIS — Z80.3 FAMILY HISTORY OF BREAST CANCER: ICD-10-CM

## 2023-02-03 DIAGNOSIS — Z80.3 FAMILY HISTORY OF BREAST CANCER: ICD-10-CM

## 2023-02-03 DIAGNOSIS — Z12.31 SCREENING MAMMOGRAM FOR BREAST CANCER: Primary | ICD-10-CM
